# Patient Record
(demographics unavailable — no encounter records)

---

## 2025-01-03 NOTE — PHYSICAL EXAM
[de-identified] : Examination today reveals a level pelvis symmetric leg lengths and stance and a normal gait she has full motion to the right hip and knee symmetric to the opposite side.  The right knee has a small effusion no instability on stress of the cruciate/collateral ligaments.  She does have discomfort on patellofemoral grind and no obvious maltracking on stress.  No significant joint line tenderness is noted popliteal fossa calf and neurovascular exam are stable.  X-rays ordered and taken today 4 views of the right knee AP lateral notch and skyline views revealed no significant abnormality.

## 2025-01-03 NOTE — HISTORY OF PRESENT ILLNESS
[de-identified] : This 23-year-old is seen today for evaluation of the right knee.  This patient has had a 1 month history of discomfort she was walking quickly in [facility] and she had onset of discomfort along the anterior aspect of the knee.  She felt a "popping clicking sensation but no sadie instability.  This has caused discomfort with no significant swelling locking buckling or sensation of instability.  Prior to this she had no complaints.  Past medical history is noncontributory

## 2025-01-03 NOTE — ASSESSMENT
[FreeTextEntry1] : Impression: Internal derangement right knee, chondromalacia patella.  She will be treated with formal physical therapy along with a course of diclofenac with GI precautions.  She will return at the conclusion of therapy if she continues to be symptomatic